# Patient Record
Sex: MALE | Race: WHITE
[De-identification: names, ages, dates, MRNs, and addresses within clinical notes are randomized per-mention and may not be internally consistent; named-entity substitution may affect disease eponyms.]

---

## 2017-11-24 ENCOUNTER — HOSPITAL ENCOUNTER (EMERGENCY)
Dept: HOSPITAL 61 - ER | Age: 52
Discharge: HOME | End: 2017-11-24
Payer: COMMERCIAL

## 2017-11-24 VITALS — BODY MASS INDEX: 23.8 KG/M2 | HEIGHT: 71 IN | WEIGHT: 170 LBS

## 2017-11-24 VITALS — DIASTOLIC BLOOD PRESSURE: 98 MMHG | SYSTOLIC BLOOD PRESSURE: 181 MMHG
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
## 2017-11-24 DIAGNOSIS — I10: ICD-10-CM
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
## 2017-11-24 DIAGNOSIS — S50.01XA: ICD-10-CM

## 2017-11-24 DIAGNOSIS — S41.111A: Primary | ICD-10-CM
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
## 2017-11-24 PROCEDURE — 73080 X-RAY EXAM OF ELBOW: CPT

## 2017-11-24 PROCEDURE — 12001 RPR S/N/AX/GEN/TRNK 2.5CM/<: CPT

## 2017-11-24 NOTE — RAD
3 views right elbow  11/24/2017 5:19 PM



Indication: fell during dog attack, pain in elbow



Comparison: None



Findings: There is no fracture or dislocation identified. Articular surfaces

are uninterrupted. Soft tissues are unremarkable.



Impression: No evidence of acute osseous abnormality

## 2017-11-24 NOTE — PHYS DOC
Past Medical History


Past Medical History:  Arrhythmia, Hypertension


Past Surgical History:  No Surgical History


Alcohol Use:  None


Drug Use:  None





Adult General


Chief Complaint


Chief Complaint:  ANIMAL BITE





[facility]


HPI





Patient is a 52  year old male who presents with a dog bite and contusion to 

the right elbow. The patient was outside raking leaves when the next door 

neighbor's to dogs came into his yard. He was trying to shoo the dogs back into 

his neighbors yard when the smaller dog grabbed a hold of his pants leg pulling 

him down. He then felt what he described as being hit by a baseball bat in his 

right arm and realized that the larger dog had bit him. They did get in the car 

and immediately presented to the emergency department. He has not taken any 

medication for this wound. He did have a tetanus vaccine in 2014.  Animal 

control did call back to the ED and confirmed that the dogs are both covered 

for all their vaccinations including rabies.





Review of Systems


Review of Systems





Constitutional: Denies fever or chills []


Respiratory: Denies cough or shortness of breath []


Cardiovascular: No additional information not addressed in HPI []


Musculoskeletal: See history of present illness


Integument: See history of present illness


Neurologic: Denies headache, focal weakness or sensory changes []


Endocrine: Denies polyuria or polydipsia []





All other systems were reviewed and found to be within normal limits, except as 

documented in this note.





Current Medications


Current Medications





Current Medications








 Medications


  (Trade)  Dose


 Ordered  Sig/[facility]  Start Time


 Stop Time Status Last Admin


Dose Admin


 


 Lidocaine/Sodium


 Bicarbonate


  (Buffered


 Lidocaine 1%)  20 ml  1X  ONCE  11/24/17 15:30


 11/24/17 15:33 DC  


 











Allergies


Allergies





Allergies








Coded Allergies Type Severity Reaction Last Updated Verified


 


  No Known Drug Allergies    11/24/17 No











Physical Exam


Physical Exam





Constitutional: Well developed, well nourished, no acute distress, non-toxic 

appearance. []


Neck: Normal range of motion, no tenderness, supple, no stridor. [] 


Cardiovascular:Heart rate regular rhythm, no murmur []


Lungs & Thorax:  Bilateral breath sounds clear to auscultation []


Skin: Patient has an approximately 2 cm laceration to his right arm just 

proximal to the elbow, the laceration is gaping


Back: No tenderness, no CVA tenderness. [] 


Extremities:  ROM intact, there is a 2 and half centimeter in diameter 

contusion over the right elbow where the patient impacted on the ground, pulses 

and sensation are intact distal to injury


Neurologic: Alert and oriented X 3, normal motor function, normal sensory 

function, no focal deficits noted. []


Psychologic: Affect normal, judgement normal, mood normal. []





Current Patient Data


Vital Signs





 Vital Signs








  Date Time  Temp Pulse Resp B/P (MAP) Pulse Ox O2 Delivery O2 Flow Rate FiO2


 


11/24/17 14:58 97.9 75 16  99 Room Air  





 97.9       











EKG


EKG


[]





Radiology/Procedures


Radiology/Procedures


[]


Procedure: Laceration repair


Indications: 2 cm gaping laceration to the right upper arm from a dog bite


Patient consent: The procedure and risks were explained in detail. Questions 

were encouraged and answered.


Anesthesia: 1% buffered lidocaine


Description of procedure: Following soaking, the area was prepped and draped 

using sterile techniques. Wound was vigorously cleansed with antiseptic 

solution. Local infiltration with 1% buffered lidocaine was well-tolerated. 

Nonabsorbable suture material was used. A sterile dressing was applied.


Number of stitches: 6


Patient tolerated the procedure well


Complications: None


Estimated blood loss: 20 ml


Disposition wound care instructions were given patient discharged home follow-

up with primary care provider in 7-10 days.





Course & Med Decision Making


Course & Med Decision Making


Pertinent Labs and Imaging studies reviewed. (See chart for details)





[]1. Dog bite


2. Contusion


3. Laceration





The patient did have a current tetanus shot. He was placed on Augmentin for 10 

days. He was given instructions to have sutures removed in 7-10 days or to 

return to the ED if worsening.





Dragon Disclaimer


Dragon Disclaimer


This electronic medical record was generated, in whole or in part, using a 

voice recognition dictation system.





Departure


Departure


Referrals:  


TWYLA CUMMINGS MD (PCP)


Scripts


Hydrocodone/Apap 5-325 (NORCO 5-325 TABLET) 1 Each Tablet


1-2 TAB PO Q4-6HRS, #10 TAB


   Prov: KYLER KAMINSKI APRN         11/24/17 


Amoxicillin/Potassium Clav (AUGMENTIN 875-125 TABLET) 1 Each Tablet


1 TAB PO BID, #20 TAB


   Prov: KYLER KAMINSKI APRN         11/24/17











KYLER KAMINSKI APRN Nov 24, 2017 15:30

## 2018-01-08 ENCOUNTER — HOSPITAL ENCOUNTER (OUTPATIENT)
Dept: HOSPITAL 61 - NM | Age: 53
Discharge: HOME | End: 2018-01-08
Attending: INTERNAL MEDICINE
Payer: COMMERCIAL

## 2018-01-08 DIAGNOSIS — I10: ICD-10-CM

## 2018-01-08 DIAGNOSIS — R00.2: ICD-10-CM

## 2018-01-08 DIAGNOSIS — R07.9: Primary | ICD-10-CM

## 2018-01-08 PROCEDURE — 96375 TX/PRO/DX INJ NEW DRUG ADDON: CPT

## 2018-01-08 PROCEDURE — 78452 HT MUSCLE IMAGE SPECT MULT: CPT

## 2018-01-08 PROCEDURE — 96374 THER/PROPH/DIAG INJ IV PUSH: CPT

## 2018-01-08 PROCEDURE — 96376 TX/PRO/DX INJ SAME DRUG ADON: CPT

## 2018-01-08 PROCEDURE — 93306 TTE W/DOPPLER COMPLETE: CPT

## 2018-01-08 PROCEDURE — 93225 XTRNL ECG REC<48 HRS REC: CPT

## 2018-01-08 PROCEDURE — 93017 CV STRESS TEST TRACING ONLY: CPT

## 2018-01-08 RX ADMIN — REGADENOSON 1 MG: 0.08 INJECTION, SOLUTION INTRAVENOUS at 12:03

## 2018-01-23 ENCOUNTER — HOSPITAL ENCOUNTER (EMERGENCY)
Dept: HOSPITAL 61 - ER | Age: 53
Discharge: HOME | End: 2018-01-23
Payer: COMMERCIAL

## 2018-01-23 DIAGNOSIS — F10.10: ICD-10-CM

## 2018-01-23 DIAGNOSIS — I10: ICD-10-CM

## 2018-01-23 DIAGNOSIS — E87.5: Primary | ICD-10-CM

## 2018-01-23 LAB
ANION GAP SERPL CALC-SCNC: 10 MMOL/L (ref 6–14)
BLOOD UREA NITROGEN: 23 MG/DL (ref 8–26)
CALCIUM: 9.3 MG/DL (ref 8.5–10.1)
CHLORIDE: 102 MMOL/L (ref 98–107)
CO2 SERPL-SCNC: 26 MMOL/L (ref 21–32)
CREAT SERPL-MCNC: 1.1 MG/DL (ref 0.7–1.3)
GFR SERPLBLD BASED ON 1.73 SQ M-ARVRAT: 70 ML/MIN
GLUCOSE SERPL-MCNC: 104 MG/DL (ref 70–99)
POTASSIUM SERPL-SCNC: 3.6 MMOL/L (ref 3.5–5.1)
SODIUM: 138 MMOL/L (ref 136–145)

## 2018-01-23 PROCEDURE — 93005 ELECTROCARDIOGRAM TRACING: CPT

## 2018-01-23 PROCEDURE — 99285 EMERGENCY DEPT VISIT HI MDM: CPT

## 2018-01-23 PROCEDURE — 80048 BASIC METABOLIC PNL TOTAL CA: CPT

## 2018-01-23 PROCEDURE — 36415 COLL VENOUS BLD VENIPUNCTURE: CPT
